# Patient Record
Sex: MALE | Race: WHITE | NOT HISPANIC OR LATINO | Employment: FULL TIME | URBAN - METROPOLITAN AREA
[De-identification: names, ages, dates, MRNs, and addresses within clinical notes are randomized per-mention and may not be internally consistent; named-entity substitution may affect disease eponyms.]

---

## 2019-04-16 ENCOUNTER — HOSPITAL ENCOUNTER (OUTPATIENT)
Dept: MEDSURG UNIT | Facility: HOSPITAL | Age: 49
End: 2019-04-18
Attending: SURGERY | Admitting: SURGERY

## 2019-04-16 LAB
ABS NEUT (OLG): 12.52 X10(3)/MCL (ref 2.1–9.2)
ALBUMIN SERPL-MCNC: 3.9 GM/DL (ref 3.4–5)
ALBUMIN/GLOB SERPL: 1.1 RATIO (ref 1.1–2)
ALP SERPL-CCNC: 102 UNIT/L (ref 50–136)
ALT SERPL-CCNC: 53 UNIT/L (ref 12–78)
APPEARANCE, UA: CLEAR
AST SERPL-CCNC: 25 UNIT/L (ref 15–37)
BACTERIA SPEC CULT: ABNORMAL /HPF
BASOPHILS # BLD AUTO: 0 X10(3)/MCL (ref 0–0.2)
BASOPHILS NFR BLD AUTO: 0 %
BILIRUB SERPL-MCNC: 0.4 MG/DL (ref 0.2–1)
BILIRUB UR QL STRIP: NEGATIVE
BILIRUBIN DIRECT+TOT PNL SERPL-MCNC: 0.1 MG/DL (ref 0–0.5)
BILIRUBIN DIRECT+TOT PNL SERPL-MCNC: 0.3 MG/DL (ref 0–0.8)
BUN SERPL-MCNC: 15 MG/DL (ref 7–18)
CALCIUM SERPL-MCNC: 8.9 MG/DL (ref 8.5–10.1)
CHLORIDE SERPL-SCNC: 102 MMOL/L (ref 98–107)
CO2 SERPL-SCNC: 24 MMOL/L (ref 21–32)
COLOR UR: YELLOW
CREAT SERPL-MCNC: 1.02 MG/DL (ref 0.7–1.3)
ERYTHROCYTE [DISTWIDTH] IN BLOOD BY AUTOMATED COUNT: 13.4 % (ref 11.5–17)
GLOBULIN SER-MCNC: 3.4 GM/DL (ref 2.4–3.5)
GLUCOSE (UA): NEGATIVE
GLUCOSE SERPL-MCNC: 137 MG/DL (ref 74–106)
HCT VFR BLD AUTO: 51.7 % (ref 42–52)
HGB BLD-MCNC: 16.6 GM/DL (ref 14–18)
HGB UR QL STRIP: NEGATIVE
KETONES UR QL STRIP: ABNORMAL
LEUKOCYTE ESTERASE UR QL STRIP: NEGATIVE
LIPASE SERPL-CCNC: 58 UNIT/L (ref 73–393)
LYMPHOCYTES # BLD AUTO: 1 X10(3)/MCL (ref 0.6–4.6)
LYMPHOCYTES NFR BLD AUTO: 7 %
MCH RBC QN AUTO: 28.8 PG (ref 27–31)
MCHC RBC AUTO-ENTMCNC: 32.1 GM/DL (ref 33–36)
MCV RBC AUTO: 89.6 FL (ref 80–94)
MONOCYTES # BLD AUTO: 0.6 X10(3)/MCL (ref 0.1–1.3)
MONOCYTES NFR BLD AUTO: 4 %
NEUTROPHILS # BLD AUTO: 12.52 X10(3)/MCL (ref 2.1–9.2)
NEUTROPHILS NFR BLD AUTO: 88 %
NITRITE UR QL STRIP: NEGATIVE
PH UR STRIP: 7 [PH] (ref 5–9)
PLATELET # BLD AUTO: 172 X10(3)/MCL (ref 130–400)
PMV BLD AUTO: 11.3 FL (ref 9.4–12.4)
POTASSIUM SERPL-SCNC: 4.1 MMOL/L (ref 3.5–5.1)
PROT SERPL-MCNC: 7.3 GM/DL (ref 6.4–8.2)
PROT UR QL STRIP: NEGATIVE
RBC # BLD AUTO: 5.77 X10(6)/MCL (ref 4.7–6.1)
RBC #/AREA URNS HPF: ABNORMAL /[HPF]
SODIUM SERPL-SCNC: 135 MMOL/L (ref 136–145)
SP GR UR STRIP: 1.02 (ref 1–1.03)
SQUAMOUS EPITHELIAL, UA: ABNORMAL
TROPONIN I SERPL-MCNC: <0.02 NG/ML (ref 0.02–0.49)
UROBILINOGEN UR STRIP-ACNC: 1
WBC # SPEC AUTO: 14.3 X10(3)/MCL (ref 4.5–11.5)
WBC #/AREA URNS HPF: ABNORMAL /HPF

## 2019-04-17 LAB
ABS NEUT (OLG): 9.04 X10(3)/MCL (ref 2.1–9.2)
BASOPHILS # BLD AUTO: 0 X10(3)/MCL (ref 0–0.2)
BASOPHILS NFR BLD AUTO: 0 %
BUN SERPL-MCNC: 13 MG/DL (ref 7–18)
CALCIUM SERPL-MCNC: 8 MG/DL (ref 8.5–10.1)
CHLORIDE SERPL-SCNC: 106 MMOL/L (ref 98–107)
CO2 SERPL-SCNC: 26 MMOL/L (ref 21–32)
CREAT SERPL-MCNC: 0.98 MG/DL (ref 0.7–1.3)
CREAT/UREA NIT SERPL: 13.3
EOSINOPHIL # BLD AUTO: 0 X10(3)/MCL (ref 0–0.9)
EOSINOPHIL NFR BLD AUTO: 0 %
ERYTHROCYTE [DISTWIDTH] IN BLOOD BY AUTOMATED COUNT: 13.6 % (ref 11.5–17)
GLUCOSE SERPL-MCNC: 98 MG/DL (ref 74–106)
HCT VFR BLD AUTO: 47.8 % (ref 42–52)
HGB BLD-MCNC: 15.6 GM/DL (ref 14–18)
LYMPHOCYTES # BLD AUTO: 1.8 X10(3)/MCL (ref 0.6–4.6)
LYMPHOCYTES NFR BLD AUTO: 15 %
MCH RBC QN AUTO: 29.3 PG (ref 27–31)
MCHC RBC AUTO-ENTMCNC: 32.6 GM/DL (ref 33–36)
MCV RBC AUTO: 89.8 FL (ref 80–94)
MONOCYTES # BLD AUTO: 1 X10(3)/MCL (ref 0.1–1.3)
MONOCYTES NFR BLD AUTO: 9 %
NEUTROPHILS # BLD AUTO: 9.04 X10(3)/MCL (ref 2.1–9.2)
NEUTROPHILS NFR BLD AUTO: 76 %
PLATELET # BLD AUTO: 166 X10(3)/MCL (ref 130–400)
PMV BLD AUTO: 11.2 FL (ref 9.4–12.4)
POTASSIUM SERPL-SCNC: 3.9 MMOL/L (ref 3.5–5.1)
RBC # BLD AUTO: 5.32 X10(6)/MCL (ref 4.7–6.1)
SODIUM SERPL-SCNC: 138 MMOL/L (ref 136–145)
WBC # SPEC AUTO: 12 X10(3)/MCL (ref 4.5–11.5)

## 2022-04-30 NOTE — ED PROVIDER NOTES
Patient:   Grant Gore            MRN: [id number]            FIN: [id number]               Age:   48 years     Sex:  Male     :  1970   Associated Diagnoses:   Acute appendicitis   Author:   Marcia SALINAS MD, Mike RONDON      Basic Information   Time seen: Date & time 2019 18:34:00.   History source: Patient.   Arrival mode: Private vehicle.   History limitation: None.      History of Present Illness   The patient presents with 47 y/o cm presents with right upper abdominal pain since this AM.  Admits nausea and dry heaving.  Normal BM.  States pain radiates into chest.  CANDIS Cagle, Dr. Kennedy, assumed care of this patient at .   48 year old CM with hx of a hernia repair presents to the ED with constant right abdominal pain, onset of this morning. Pt states his pain feels pressure under hsi ribs and like he has a stomach ache. He says his symptoms have gotten progressively worse through the day. He states he has never felt his symptoms before.  He ate this morning at 6:30, but is not hungry presently. At 1230 today, pt states he dry heaved. Pt denies diarrhea, dysuria, chest pain, constipation, feeling nauseated currently, hematuria, recent indigestion, head ache, neck pain, vision changes, swollen glands, weak extremities, or an ear ache. His level of pain is a 8/10 currently..  The onset was this morning (19).  The course/duration of symptoms is constant and worsening.  The character of symptoms is pressure.  The degree at onset was moderate.  The Location of pain at onset was right and abdominal.  The degree at present is moderate, 8 /10.  The Location of pain at present is right and abdominal.  Radiating pain: none. The exacerbating factor is none.  The relieving factor is none.  Therapy today: none.  Risk factors consist of none.  Associated symptoms: nausea, Denies constipation, hematuria, current nausea, recent indigestion, swollen glands, weak extremities, and ear  ache, denies chest pain, denies vomiting, denies diarrhea, denies headache, denies neck pain, denies altered vision and denies dysuria.  Additional history: none.        Review of Systems   Constitutional symptoms:  No fever, no chills, no sweats, no weakness.    Skin symptoms:  No rash,    Eye symptoms:  No recent vision problems,    ENMT symptoms:  No ear pain,    Respiratory symptoms:  No shortness of breath, no orthopnea.    Cardiovascular symptoms:  No chest pain, no palpitations.    Gastrointestinal symptoms:  Abdominal pain, right upper quadrant, right lower quadrant, Denies recent indigestion., no nausea, no vomiting, no diarrhea, no constipation.    Genitourinary symptoms:  No dysuria, no hematuria.    Musculoskeletal symptoms:  Denies neck pain., no back pain, no Muscle pain.    Neurologic symptoms:  Denies weak extremities, no headache, no vision changes.    Psychiatric symptoms:  No anxiety, no depression.    Hematologic/Lymphatic symptoms:  No swollen nodes,    Allergy/immunologic symptoms:  No seasonal allergies, no food allergies.              Additional review of systems information: All other systems reviewed and otherwise negative.      Health Status   Allergies: No known allergies.   Medications:  (Selected)   Inpatient Medications  Ordered  Sodium Chloride 0.9% 1000mL 1,000 mL: 1,000 mL, 1,000 mL, IV, 150 mL/hr, start date 04/16/19 20:10:00 CDT  Toradol: 30 mg, form: Injection, IV Push, Once, first dose 04/16/19 20:09:00 CDT, stop date 04/16/19 20:09:00 CDT, STAT.      Past Medical/ Family/ Social History   Medical history:    No active or resolved past medical history items have been selected or recorded..   Surgical history: Hernia repair.   Family history:    No family history items have been selected or recorded..   Social history: Tobacco use: Denies, Occupation: Employed.      Physical Examination               Vital Signs               Per nurse's notes.   Oxygen saturation.   General:   Alert, no acute distress.    Skin:  Warm, pink, intact, moist, no rash.    Head:  Normocephalic, atraumatic.    Cardiovascular:  Regular rate and rhythm, No murmur, Normal peripheral perfusion, No edema.    Respiratory:  Lungs are clear to auscultation, respirations are non-labored, breath sounds are equal, Symmetrical chest wall expansion.    Gastrointestinal:  Soft, Non distended, Normal bowel sounds, Tenderness: Mild, right lower quadrant, Guarding: Involuntary, Rebound: Positive.    Musculoskeletal:  Normal ROM, normal strength.    Neurological:  Alert and oriented to person, place, time, and situation, No focal neurological deficit observed, CN II-XII intact, normal sensory observed, normal motor observed, normal speech observed.    Lymphatics:  No lymphadenopathy.   Psychiatric:  Cooperative, appropriate mood & affect, normal judgment.       Medical Decision Making   Documents reviewed:  Emergency department nurses' notes, emergency department records.    Orders  Launch Orders   Laboratory:  Troponin-I (Order): Stat collect, 4/16/2019 18:36 CDT, Blood, Lab Collect, Print Label By Order Location, 4/16/2019 18:36 CDT  Urinalysis Complete a reflex to culture (Order): Stat collect, Urine, 4/16/2019 18:36 CDT, Nurse collect, Print Label By Order Location  Lipase Level (Order): Stat collect, 4/16/2019 18:36 CDT, Blood, Lab Collect, Print Label By Order Location, 4/16/2019 18:36 CDT  CMP (Order): Stat collect, 4/16/2019 18:35 CDT, Blood, Lab Collect, Print Label By Order Location, 4/16/2019 18:35 CDT  CBC w/ Auto Diff (Order): Now collect, 4/16/2019 18:35 CDT, Blood, Lab Collect, Print Label By Order Location, 4/16/2019 18:35 CDT  Patient Care:  Saline Lock Insert (Order): 4/16/2019 18:36 CDT  Pharmacy:  Sodium Chloride 0.9% intravenous solution (IVF Normal Saline NS Bolus 1000ml) (Order): 1,000 mL, 1,000 mL, IV, start date 4/16/2019 18:36 CDT, stop date 4/16/2019 18:36 CDT  morphine 4 mg/mL preservative-free  intravenous solution (Order): 4 mg, IV Push, Once-NOW, first dose 4/16/2019 18:36 CDT, stop date 4/16/2019 18:36 CDT, STAT, ( > 7 on pain scale)  Zofran 2 mg/mL injectable solution (Order): 4 mg, form: Injection, IV Push, Once, first dose 4/16/2019 18:36 CDT, stop date 4/16/2019 18:36 CDT, STAT  Radiology:  XR Chest 2 Views (Order): Stat, 4/16/2019 18:36 CDT, Chest Pain, None, Stretcher, Rad Type, Not Scheduled  Cardiology:  EKG (Order): 4/16/2019 18:36 CDT, NOW, -1, -1, 4/16/2019 18:36 CDT.   Results review:  Lab results : Lab View   4/16/2019 18:47 CDT      Sodium Lvl                135 mmol/L  LOW                             Potassium Lvl             4.1 mmol/L                             Chloride                  102 mmol/L                             CO2                       24.0 mmol/L                             Calcium Lvl               8.9 mg/dL                             Glucose Lvl               137 mg/dL  HI                             BUN                       15.0 mg/dL                             Creatinine                1.02 mg/dL                             eGFR-AA                   >60 mL/min/1.73 m2  NA                             eGFR-JEFF                  >60 mL/min/1.73 m2  NA                             Bili Total                0.4 mg/dL                             Bili Direct               0.10 mg/dL                             Bili Indirect             0.30 mg/dL                             AST                       25 unit/L                             ALT                       53 unit/L                             Alk Phos                  102 unit/L                             Total Protein             7.3 gm/dL                             Albumin Lvl               3.90 gm/dL                             Globulin                  3.40 gm/dL                             A/G Ratio                 1.1 ratio                             Lipase Lvl                58 unit/L  LOW                              Troponin-I                <0.02 ng/mL                             WBC                       14.3 x10(3)/mcL  HI                             RBC                       5.77 x10(6)/mcL                             Hgb                       16.6 gm/dL                             Hct                       51.7 %                             Platelet                  172 x10(3)/mcL                             MCV                       89.6 fL                             MCH                       28.8 pg                             MCHC                      32.1 gm/dL  LOW                             RDW                       13.4 %                             MPV                       11.3 fL                             Abs Neut                  12.52 x10(3)/mcL  HI                             Neutro Auto               88 %  NA                             Lymph Auto                7 %  NA                             Mono Auto                 4 %  NA                             Basophil Auto             0 %  NA                             Abs Neutro                12.52 x10(3)/mcL  HI                             Abs Lymph                 1.0 x10(3)/mcL                             Abs Mono                  0.6 x10(3)/mcL                             Abs Baso                  0.0 x10(3)/mcL    4/16/2019 18:43 CDT      UA Appear                 CLEAR                             UA Color                  YELLOW                             UA Spec Grav              1.024                             UA Bili                   Negative                             UA pH                     7.0                             UA Urobilinogen           1.0                             UA Blood                  Negative                             UA Glucose                Negative                             UA Ketones                2+                             UA Protein                Negative                             UA  Nitrite                Negative                             UA Leuk Est               Negative                             UA WBC                    NONE SEEN /HPF                             UA RBC                    NONE SEEN                             UA Bacteria               NONE SEEN /HPF                             UA Squam Epithelial       NONE SEEN  .   Radiology results:  Computed tomography, CT of the abdomen acute appendicitis with surrounding inflammation no abscess or rupture.       Reexamination/ Reevaluation   Time: 4/16/2019 21:08:00 .   Interventions: Pain well controlled patient CT positive acute appendicitis nontoxic Zosyn given IV discussed case with surgical hospitalist will admit.      Impression and Plan   Diagnosis   Acute appendicitis (HPA83-KB K35.80)   Plan   Condition: Improved, Stable.    Disposition: Admit time  4/16/2019 21:08:00, Admit to Inpatient Unit.    Counseled: Patient, Family, Regarding diagnosis, Regarding diagnostic results, Regarding treatment plan, Regarding prescription, Patient indicated understanding of instructions.    Notes: I, Cydney Villa,  worked solely as a scribe for ,       This scribes note accurately reflects the work done by me I have reviewed the note and personally performed a history and physical and agree with all the documentation and findings.

## 2022-04-30 NOTE — OP NOTE
DATE OF SURGERY:    04/17/2019    SURGEON:  Oseas Kaplan Jr., MD    RESIDENT SURGEON:  Dr. Leidy Gilmore, PGY 2.    PREOPERATIVE DIAGNOSIS:  Acute appendicitis.    POSTOPERATIVE DIAGNOSIS:  Acute appendicitis.    PROCEDURE PERFORMED:  Laparoscopic appendectomy.    ANESTHESIA:  General endotracheal.    COMPLICATIONS:  None.    CONDITION:  Stable.    ESTIMATED BLOOD LOSS:  10 cc.    FINDINGS:  Mr. Gore had a retrocecal appendix that was adhesed to the terminal ileum and being covered up.  This required extensive lysis of adhesions to remove the small bowel off the appendix and then freeing it up from the retrocecal space.    PROCEDURE IN DETAIL:  After proper written consents were obtained, the patient was brought back to the operative suite, placed in supine position, placed under general endotracheal anesthesia.  Next, abdomen was prepped and draped in usual sterile fashion.  At this time, a time-out was done to make sure we had the appropriate patient, appropriate operation, appropriate preoperative medications.  Next, a supraumbilical incision was made with an 11 blade, and a 5 mm optical trocar was used to enter the belly.  Once in the abdomen, pneumoperitoneum was created.  Next, a 5 mm suprapubic and 12 mm left lower quadrant trocar were placed under direct visualization with no complications.  Upon getting in, we visualized the terminal ileum adhesed to the abdominal sidewall and covering the cecum and the appendix.  It was densely adhesed, and this was taken down with a combination of electrocautery and sharp dissection with the laparoscopic Endoscissors.  Once we were able to mobilize and medialize the terminal ileum out of the way, we were able to visualize the retrocecal appendix.  After some time of blunt dissection down into the retrocecal position, we were able to visualize where the appendix came off the cecum.  Once we had the base of the appendix identified, we then made a window between  the mesoappendix and the cecum, and since the base of the appendix was very inflamed and not healthy-looking, we opted to take it lower and taking some of the cecum with it.  We stapled across this with a laparoscopic [place] [address] stapler 45 mm blue load, and then, we came across the mesoappendix with a laparoscopic [address] stapler white load 45 mm.  The large inflamed appendix was then placed in a laparoscopic Endo Catch bag and then removed through the left lower quadrant incision.  Next, our staple lines were investigated for any bleeding.  No bleeding was noted.  We then removed the appendix through the left lower quadrant incision.  Next, a Tor-Liliana suture passer was used to close the left lower quadrant fascia incision under laparoscopic visualization, and then, all skin incisions were closed with interrupted 4-0 Vicryl and dressed with Steri-Strips.  At case end, all counts were correct.  Patient tolerated procedure well, was ultimately transferred to the floor in stable condition.        ______________________________  MD NAVA Hayes Jr./NORM  DD:  04/17/2019  Time:  10:30AM  DT:  04/17/2019  Time:  10:42AM  Job #:  [id number]

## 2022-04-30 NOTE — H&P
"   Patient:   Grant Gore            MRN: [id number]            FIN: [id number]               Age:   48 years     Sex:  Male     :  1970   Associated Diagnoses:   None   Author:   Tyrell Bains      Basic Information   Admit information:  Right lower quadrant abdominal pain .    Source of history:  Self, Family member, Medical record.    Present at bedside:  Medical personnel.    Referral source:  Emergency department.    History limitation:  None.       History of Present Illness   48-year-old white male presents to the emergency department with a 1 day history of right lower quadrant abdominal pain.  He reports nausea at "dry heaving".  Bowel movements normal.  Denies fever.  No past medical history.  Past surgical history is a laparoscopic right inguinal hernia repair as well as a cosmetic jaw surgery in the past.  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      Review of Systems   Constitutional:  Decreased activity, No fever, No chills.    Eye:  No recent visual problem.    Ear/Nose/Mouth/Throat:  Negative.    Respiratory:  No shortness of breath, No cough, No sputum production.    Cardiovascular:  No chest pain, No bradycardia, No syncope.    Gastrointestinal:  Negative except as documented in history of present illness.    Genitourinary:  Negative.    Immunologic:  Negative.    Musculoskeletal:  No neck pain, No joint pain, No muscle pain.    Integumentary:  Negative.    Neurologic:  Alert and oriented X4, No headache.    Psychiatric:  No anxiety, No depression.       Health Status   Allergies:    Allergies (1) Active Reaction  No Known Allergies None Documented   Current medications:    Medications (5) Active  Scheduled: (1)  piperacillin-tazobactam  3.375 gm 1 EA, IV Piggyback, q8hr  Continuous: (1)  sodium chloride 0.9% 1,000 mL  1,000 mL, IV, 150 mL/hr  PRN: (3)  morphine 4 mg/mL preservative-free Soln  2 mg 0.5 mL, IV, q4hr  morphine 4 mg/mL preservative-free Soln "  4 mg 1 mL, IV, q4hr  ondansetron 2 mg/mL inj - 2mL  4 mg 2 mL, IV Push, q4hr   Problem list:    No qualifying data available      Histories   Past Medical History: Negative   Procedure history: As above   Social History        Social & Psychosocial Habits    Tobacco  04/16/2019  Use: Never (less than 100 in l    Patient Wants Consult For Cessation Counseling No.        Physical Examination   General:  Alert and oriented, No acute distress.    Eye:  Pupils are equal, round and reactive to light, Extraocular movements are intact.    Neck:  Supple, Non-tender.    Respiratory:  Lungs are clear to auscultation, Respirations are non-labored, Breath sounds are equal, Symmetrical chest wall expansion.    Cardiovascular:  Normal rate, Regular rhythm, Good pulses equal in all extremities, Normal peripheral perfusion.    Gastrointestinal:  Soft, Non-distended, Normal bowel sounds, Tender in the right lower quadrant.       Vital Signs (last 24 hrs)_____  Last Charted___________  Temp Oral     36.8 DegC  (APR 16 18:32)  Heart Rate Peripheral   94 bpm  (APR 16 21:00)  Resp Rate         15 br/min  (APR 16 21:00)  SBP      H 153mmHg  (APR 16 21:00)  DBP      90 mmHg  (APR 16 21:00)  SpO2      94 %  (APR 16 21:00)   Musculoskeletal:  Normal range of motion, Normal strength, No deformity.    Integumentary:  Warm, Dry.    Neurologic:  Alert, Oriented.    Psychiatric:  Cooperative, Appropriate mood & affect.       Review / Management   Labs Last 24 Hours   Chemistry  Hematology/Coagulation    Sodium Lvl: 135 mmol/L Low (04/16/19 18:47:00) WBC: 14.3 x10(3)/mcL High (04/16/19 18:47:00)   Potassium Lvl: 4.1 mmol/L (04/16/19 18:47:00) RBC: 5.77 x10(6)/mcL (04/16/19 18:47:00)   Chloride: 102 mmol/L (04/16/19 18:47:00) Hgb: 16.6 gm/dL (04/16/19 18:47:00)   CO2: 24 mmol/L (04/16/19 18:47:00) Hct: 51.7 % (04/16/19 18:47:00)   Calcium Lvl: 8.9 mg/dL (04/16/19 18:47:00) Platelet: 172 x10(3)/mcL (04/16/19 18:47:00)   Glucose Lvl: 137 mg/dL High  (04/16/19 18:47:00) MCV: 89.6 fL (04/16/19 18:47:00)   BUN: 15 mg/dL (04/16/19 18:47:00) MCH: 28.8 pg (04/16/19 18:47:00)   Creatinine: 1.02 mg/dL (04/16/19 18:47:00) MCHC: 32.1 gm/dL Low (04/16/19 18:47:00)   eGFR-AA: >60 (04/16/19 18:47:00) RDW: 13.4 % (04/16/19 18:47:00)   eGFR-JEFF: >60 (04/16/19 18:47:00) MPV: 11.3 fL (04/16/19 18:47:00)   Bili Total: 0.4 mg/dL (04/16/19 18:47:00) Abs Neut: 12.52 x10(3)/mcL High (04/16/19 18:47:00)   Bili Direct: 0.1 mg/dL (04/16/19 18:47:00) Neutro Auto: 88 % (04/16/19 18:47:00)   Bili Indirect: 0.3 mg/dL (04/16/19 18:47:00) Lymph Auto: 7 % (04/16/19 18:47:00)   AST: 25 unit/L (04/16/19 18:47:00) Mono Auto: 4 % (04/16/19 18:47:00)   ALT: 53 unit/L (04/16/19 18:47:00) Basophil Auto: 0 % (04/16/19 18:47:00)   Alk Phos: 102 unit/L (04/16/19 18:47:00) Abs Neutro: 12.52 x10(3)/mcL High (04/16/19 18:47:00)   Total Protein: 7.3 gm/dL (04/16/19 18:47:00) Abs Lymph: 1 x10(3)/mcL (04/16/19 18:47:00)   Albumin Lvl: 3.9 gm/dL (04/16/19 18:47:00) Abs Mono: 0.6 x10(3)/mcL (04/16/19 18:47:00)   Globulin: 3.4 gm/dL (04/16/19 18:47:00) Abs Baso: 0 x10(3)/mcL (04/16/19 18:47:00)   A/G Ratio: 1.1 ratio (04/16/19 18:47:00)    Lipase Lvl: 58 unit/L Low (04/16/19 18:47:00)    Troponin-I: <0.02 (04/16/19 18:47:00)      Accession: [id number]  Order: CT Abdomen and Pelvis W Contrast  Report Dt/Tm: 04/16/2019 20:53  Report:   CT Abdomen and Pelvis     Indication: Right-sided abdominal pain     Comparison: None     Technique:  Axial CT images were obtained through the abdomen and  pelvis after IV contrast administration.    . Coronal and sagittal  reconstructions submitted and interpreted.  Total DLP 1033. Automatic  exposure control utilized.     Findings:     Mild atelectatic changes are in the lower lungs.     Small cysts are on both kidneys. Adrenal glands, pancreas, spleen,  liver and gallbladder are normal.     Stomach is decompressed. Small bowel is normal. Appendix measures up  to 1.5 cm in  diameter with mucosal enhancement and surrounding  inflammation. Small appendicoliths are at its base. No free fluid or  free air. Colon is normal.     No enlarged lymph nodes. Abdominal aorta is normal in caliber.     Urinary bladder is normal. Prostate is normal size.     No acute osseous findings.     Impression:   1. Acute appendicitis without abscess formation or free air         Impression and Plan   Acute appendicitis    Admit to the floor  N.p.o.  Zosyn  Maintenance IV fluids  Patient is scheduled and consented for laparoscopic appendectomy in the morning  Attending surgeon aware patient

## 2022-08-28 ENCOUNTER — HOSPITAL ENCOUNTER (EMERGENCY)
Facility: HOSPITAL | Age: 52
Discharge: HOME | End: 2022-08-29
Attending: STUDENT IN AN ORGANIZED HEALTH CARE EDUCATION/TRAINING PROGRAM
Payer: COMMERCIAL

## 2022-08-28 VITALS
RESPIRATION RATE: 20 BRPM | SYSTOLIC BLOOD PRESSURE: 145 MMHG | DIASTOLIC BLOOD PRESSURE: 94 MMHG | OXYGEN SATURATION: 93 % | HEART RATE: 94 BPM | WEIGHT: 240 LBS | BODY MASS INDEX: 33.6 KG/M2 | TEMPERATURE: 99 F | HEIGHT: 71 IN

## 2022-08-28 DIAGNOSIS — R31.9 HEMATURIA, UNSPECIFIED TYPE: ICD-10-CM

## 2022-08-28 DIAGNOSIS — R10.9 LEFT FLANK PAIN: Primary | ICD-10-CM

## 2022-08-28 DIAGNOSIS — N20.1 URETEROLITHIASIS: ICD-10-CM

## 2022-08-28 DIAGNOSIS — R11.0 NAUSEA: ICD-10-CM

## 2022-08-28 LAB
ALBUMIN SERPL-MCNC: 4.4 GM/DL (ref 3.5–5)
ALBUMIN/GLOB SERPL: 1.3 RATIO (ref 1.1–2)
ALP SERPL-CCNC: 89 UNIT/L (ref 40–150)
ALT SERPL-CCNC: 25 UNIT/L (ref 0–55)
APPEARANCE UR: CLEAR
AST SERPL-CCNC: 27 UNIT/L (ref 5–34)
BASOPHILS # BLD AUTO: 0.02 X10(3)/MCL (ref 0–0.2)
BASOPHILS NFR BLD AUTO: 0.2 %
BILIRUB UR QL STRIP.AUTO: NEGATIVE MG/DL
BILIRUBIN DIRECT+TOT PNL SERPL-MCNC: 1 MG/DL
BUN SERPL-MCNC: 23 MG/DL (ref 8.4–25.7)
CALCIUM SERPL-MCNC: 9.8 MG/DL (ref 8.4–10.2)
CHLORIDE SERPL-SCNC: 105 MMOL/L (ref 98–107)
CO2 SERPL-SCNC: 21 MMOL/L (ref 22–29)
COLOR UR AUTO: YELLOW
CREAT SERPL-MCNC: 1.58 MG/DL (ref 0.73–1.18)
EOSINOPHIL # BLD AUTO: 0.03 X10(3)/MCL (ref 0–0.9)
EOSINOPHIL NFR BLD AUTO: 0.3 %
ERYTHROCYTE [DISTWIDTH] IN BLOOD BY AUTOMATED COUNT: 12.9 % (ref 11.5–17)
GFR SERPLBLD CREATININE-BSD FMLA CKD-EPI: 52 MLS/MIN/1.73/M2
GLOBULIN SER-MCNC: 3.3 GM/DL (ref 2.4–3.5)
GLUCOSE SERPL-MCNC: 127 MG/DL (ref 74–100)
GLUCOSE UR QL STRIP.AUTO: NEGATIVE MG/DL
HCT VFR BLD AUTO: 48.1 % (ref 42–52)
HGB BLD-MCNC: 16.6 GM/DL (ref 14–18)
IMM GRANULOCYTES # BLD AUTO: 0.03 X10(3)/MCL (ref 0–0.04)
IMM GRANULOCYTES NFR BLD AUTO: 0.3 %
KETONES UR QL STRIP.AUTO: ABNORMAL MG/DL
LEUKOCYTE ESTERASE UR QL STRIP.AUTO: NEGATIVE UNIT/L
LYMPHOCYTES # BLD AUTO: 1.06 X10(3)/MCL (ref 0.6–4.6)
LYMPHOCYTES NFR BLD AUTO: 10.5 %
MCH RBC QN AUTO: 29.8 PG (ref 27–31)
MCHC RBC AUTO-ENTMCNC: 34.5 MG/DL (ref 33–36)
MCV RBC AUTO: 86.4 FL (ref 80–94)
MONOCYTES # BLD AUTO: 0.59 X10(3)/MCL (ref 0.1–1.3)
MONOCYTES NFR BLD AUTO: 5.8 %
NEUTROPHILS # BLD AUTO: 8.4 X10(3)/MCL (ref 2.1–9.2)
NEUTROPHILS NFR BLD AUTO: 82.9 %
NITRITE UR QL STRIP.AUTO: NEGATIVE
NRBC BLD AUTO-RTO: 0 %
PH UR STRIP.AUTO: 6 [PH]
PLATELET # BLD AUTO: 201 X10(3)/MCL (ref 130–400)
PMV BLD AUTO: 11 FL (ref 7.4–10.4)
POTASSIUM SERPL-SCNC: 4.3 MMOL/L (ref 3.5–5.1)
PROT SERPL-MCNC: 7.7 GM/DL (ref 6.4–8.3)
PROT UR QL STRIP.AUTO: NEGATIVE MG/DL
RBC # BLD AUTO: 5.57 X10(6)/MCL (ref 4.7–6.1)
RBC UR QL AUTO: ABNORMAL UNIT/L
SODIUM SERPL-SCNC: 139 MMOL/L (ref 136–145)
SP GR UR STRIP.AUTO: >=1.03
UROBILINOGEN UR STRIP-ACNC: 0.2 MG/DL
WBC # SPEC AUTO: 10.1 X10(3)/MCL (ref 4.5–11.5)

## 2022-08-28 PROCEDURE — 85025 COMPLETE CBC W/AUTO DIFF WBC: CPT | Performed by: STUDENT IN AN ORGANIZED HEALTH CARE EDUCATION/TRAINING PROGRAM

## 2022-08-28 PROCEDURE — 99285 EMERGENCY DEPT VISIT HI MDM: CPT | Mod: 25

## 2022-08-28 PROCEDURE — 63600175 PHARM REV CODE 636 W HCPCS: Performed by: STUDENT IN AN ORGANIZED HEALTH CARE EDUCATION/TRAINING PROGRAM

## 2022-08-28 PROCEDURE — 96361 HYDRATE IV INFUSION ADD-ON: CPT

## 2022-08-28 PROCEDURE — 25000003 PHARM REV CODE 250: Performed by: STUDENT IN AN ORGANIZED HEALTH CARE EDUCATION/TRAINING PROGRAM

## 2022-08-28 PROCEDURE — 81001 URINALYSIS AUTO W/SCOPE: CPT | Performed by: STUDENT IN AN ORGANIZED HEALTH CARE EDUCATION/TRAINING PROGRAM

## 2022-08-28 PROCEDURE — 96375 TX/PRO/DX INJ NEW DRUG ADDON: CPT

## 2022-08-28 PROCEDURE — 96374 THER/PROPH/DIAG INJ IV PUSH: CPT

## 2022-08-28 PROCEDURE — 36415 COLL VENOUS BLD VENIPUNCTURE: CPT | Performed by: STUDENT IN AN ORGANIZED HEALTH CARE EDUCATION/TRAINING PROGRAM

## 2022-08-28 PROCEDURE — 80053 COMPREHEN METABOLIC PANEL: CPT | Performed by: STUDENT IN AN ORGANIZED HEALTH CARE EDUCATION/TRAINING PROGRAM

## 2022-08-28 RX ORDER — KETOROLAC TROMETHAMINE 30 MG/ML
15 INJECTION, SOLUTION INTRAMUSCULAR; INTRAVENOUS ONCE
Status: DISCONTINUED | OUTPATIENT
Start: 2022-08-28 | End: 2022-08-28

## 2022-08-28 RX ORDER — MORPHINE SULFATE 2 MG/ML
4 INJECTION, SOLUTION INTRAMUSCULAR; INTRAVENOUS
Status: COMPLETED | OUTPATIENT
Start: 2022-08-28 | End: 2022-08-28

## 2022-08-28 RX ORDER — KETOROLAC TROMETHAMINE 30 MG/ML
15 INJECTION, SOLUTION INTRAMUSCULAR; INTRAVENOUS ONCE
Status: COMPLETED | OUTPATIENT
Start: 2022-08-28 | End: 2022-08-28

## 2022-08-28 RX ADMIN — MORPHINE SULFATE 4 MG: 2 INJECTION, SOLUTION INTRAMUSCULAR; INTRAVENOUS at 09:08

## 2022-08-28 RX ADMIN — SODIUM CHLORIDE 1000 ML: 9 INJECTION, SOLUTION INTRAVENOUS at 09:08

## 2022-08-28 RX ADMIN — KETOROLAC TROMETHAMINE 15 MG: 30 INJECTION, SOLUTION INTRAMUSCULAR; INTRAVENOUS at 09:08

## 2022-08-29 LAB
BACTERIA #/AREA URNS AUTO: NORMAL /HPF
RBC #/AREA URNS AUTO: NORMAL /HPF
SQUAMOUS #/AREA URNS AUTO: NORMAL /HPF
WBC #/AREA URNS AUTO: NORMAL /HPF

## 2022-08-29 RX ORDER — ONDANSETRON 4 MG/1
4 TABLET, ORALLY DISINTEGRATING ORAL 2 TIMES DAILY
Qty: 8 TABLET | Refills: 0 | Status: SHIPPED | OUTPATIENT
Start: 2022-08-29 | End: 2022-09-02

## 2022-08-29 RX ORDER — HYDROCODONE BITARTRATE AND ACETAMINOPHEN 5; 325 MG/1; MG/1
1 TABLET ORAL EVERY 6 HOURS PRN
Qty: 5 TABLET | Refills: 0 | Status: SHIPPED | OUTPATIENT
Start: 2022-08-29

## 2022-08-29 NOTE — DISCHARGE INSTRUCTIONS
You will likely pass the stone very soon. Please return to the ED for any worsening or concerning symptoms. You may take the pain medicine as prescribed and as needed for pain. You may also take the nausea medicine as needed and as prescribed for nausea; if you take it, please allow roughly 20min for it to work. Please follow up with your PCP.

## 2022-08-29 NOTE — ED PROVIDER NOTES
Encounter Date: 8/28/2022       History     Chief Complaint   Patient presents with    Flank Pain     51 yo M no PMHx presents to the ED for 4 day history of waxing/waning, sharp, nonradiating flank pain. Patient states nothing makes pain worse or better, has not tried any medication. Has had some nausea associated with pain but no vomiting. Pt believes he has a kidney stone but he has never had one before. Denies hematuria. No testicle swelling/discoloration. No dysuria.     Review of patient's allergies indicates:  No Known Allergies  No past medical history on file.  No past surgical history on file.  No family history on file.     Review of Systems   Constitutional:  Negative for chills and fever.   HENT:  Negative for sore throat.    Respiratory:  Negative for shortness of breath.    Cardiovascular:  Negative for chest pain.   Gastrointestinal:  Positive for nausea. Negative for abdominal pain, diarrhea and vomiting.   Genitourinary:  Positive for flank pain. Negative for dysuria, hematuria, penile swelling, scrotal swelling, testicular pain and urgency.   Musculoskeletal:  Negative for back pain and myalgias.   Skin:  Negative for rash.   Neurological:  Negative for weakness and headaches.   Hematological:  Does not bruise/bleed easily.     Physical Exam     Initial Vitals   BP Pulse Resp Temp SpO2   08/28/22 2107 08/28/22 2107 08/28/22 2107 08/28/22 2131 08/28/22 2107   (!) 169/108 96 20 98.5 °F (36.9 °C) 95 %      MAP       --                Physical Exam    Nursing note and vitals reviewed.  Constitutional: He appears well-developed. He appears distressed (from pain).   HENT:   Head: Normocephalic and atraumatic.   Eyes: EOM are normal. Pupils are equal, round, and reactive to light.   Neck: Neck supple.   Normal range of motion.  Cardiovascular:  Normal rate, regular rhythm, normal heart sounds and intact distal pulses.           Pulmonary/Chest: Breath sounds normal. No respiratory distress. He has no  wheezes. He has no rhonchi. He has no rales.   Abdominal: Abdomen is soft. There is no abdominal tenderness.   No CVA tenderness There is no rebound.   Musculoskeletal:         General: No tenderness or edema. Normal range of motion.      Cervical back: Normal range of motion and neck supple.     Neurological: He is alert and oriented to person, place, and time. He has normal strength. No cranial nerve deficit or sensory deficit.   Skin: Skin is warm and dry. Capillary refill takes less than 2 seconds. No rash noted. No pallor.       ED Course   Procedures  Labs Reviewed   COMPREHENSIVE METABOLIC PANEL - Abnormal; Notable for the following components:       Result Value    Carbon Dioxide 21 (*)     Glucose Level 127 (*)     Creatinine 1.58 (*)     All other components within normal limits   URINALYSIS, REFLEX TO URINE CULTURE - Abnormal; Notable for the following components:    Ketones, UA 2+ (*)     Blood, UA 1+ (*)     All other components within normal limits   CBC WITH DIFFERENTIAL - Abnormal; Notable for the following components:    MPV 11.0 (*)     All other components within normal limits   URINALYSIS, MICROSCOPIC - Normal   CBC W/ AUTO DIFFERENTIAL    Narrative:     The following orders were created for panel order CBC auto differential.  Procedure                               Abnormality         Status                     ---------                               -----------         ------                     CBC with Differential[[id number]]        Abnormal            Final result                 Please view results for these tests on the individual orders.          Imaging Results              CT Abdomen Pelvis  Without Contrast (Preliminary result)  Result time 08/28/22 22:30:05      Preliminary result by Fuad Lofton MD (08/28/22 22:30:05)                   Narrative:    START OF REPORT:  Technique: CT of the abdomen and pelvis was performed with axial images as well as sagittal and coronal  reconstruction images without intravenous contrast.    Comparison: None available.    Clinical History: Flank Pain.    Dosage Information: Automated Exposure Control was utilized.    Findings:  Lines and Tubes: None.  Thorax:  Lungs: There is minimal nonspecific dependent change at the lung bases.  Pleura: No effusions or thickening. No pneumothorax is seen in the visualized lung bases.  Heart: The heart size is within normal limits.  Abdomen:  Abdominal Wall: There are some surgical clips in right lower anterior abdominal wall which could reflect prior surgical history.  Liver: The liver appears unremarkable.  Biliary System: No intrahepatic or extrahepatic biliary duct dilatation is seen.  Gallbladder: The gallbladder appears unremarkable.  Pancreas: The pancreas appears unremarkable.  Spleen: The spleen appears unremarkable. A probable splenule measuring 1.5 cmin diameter is seen on series 2 image 36 inferior to the spleen.  Adrenals: The adrenal glands appear unremarkable.  Kidneys: The right kidney appears unremarkable with no stones cysts masses or hydronephrosis. There is mild left-sided hydroureteronephrosis secondary to 3.7 mm stone at the left ureterovesicle junction on series 2 image 87. There is associated mild perinephric and periureteric fat stranding. The left kidney otherwise appears unremarkable with no cysts masses.  Aorta: The abdominal aorta appears unremarkable.  IVC: Unremarkable.  Bowel:  Esophagus: The visualized esophagus appears unremarkable.  Stomach: The stomach appears unremarkable.  Duodenum: Unremarkable appearing duodenum.  Small Bowel: The small bowel appears unremarkable.  Colon: Nondistended.  Appendix: No appendix is identified and surgical clips are seen at the base of the cecum consistent with history of appendectomy.  Peritoneum: No intraperitoneal free air or ascites is seen.    Pelvis:  Bladder: The bladder appears unremarkable.  Male:  Prostate gland: The prostate gland  appears unremarkable.    Bony structures:  Dorsal Spine: There is subtle spondylosis of the visualized dorsal spine.  Bony Pelvis: The visualized bony structures of the pelvis appear unremarkable.      Impression:  1. There is mild left-sided hydroureteronephrosis secondary to 3.7 mm stone at the left ureterovesicle junction on series 2 image 87. There is associated mild perinephric and periureteric fat stranding. Correlate clinically as regards additional evaluation and followup.  2. Details and other findings as discussed above.                          Preliminary result by Interface, Rad Results In (08/28/22 22:30:05)                   Narrative:    START OF REPORT:  Technique: CT of the abdomen and pelvis was performed with axial images as well as sagittal and coronal reconstruction images without intravenous contrast.    Comparison: None available.    Clinical History: Flank Pain.    Dosage Information: Automated Exposure Control was utilized.    Findings:  Lines and Tubes: None.  Thorax:  Lungs: There is minimal nonspecific dependent change at the lung bases.  Pleura: No effusions or thickening. No pneumothorax is seen in the visualized lung bases.  Heart: The heart size is within normal limits.  Abdomen:  Abdominal Wall: There are some surgical clips in right lower anterior abdominal wall which could reflect prior surgical history.  Liver: The liver appears unremarkable.  Biliary System: No intrahepatic or extrahepatic biliary duct dilatation is seen.  Gallbladder: The gallbladder appears unremarkable.  Pancreas: The pancreas appears unremarkable.  Spleen: The spleen appears unremarkable. A probable splenule measuring 1.5 cmin diameter is seen on series 2 image 36 inferior to the spleen.  Adrenals: The adrenal glands appear unremarkable.  Kidneys: The right kidney appears unremarkable with no stones cysts masses or hydronephrosis. There is mild left-sided hydroureteronephrosis secondary to 3.7 mm stone at  the left ureterovesicle junction on series 2 image 87. There is associated mild perinephric and periureteric fat stranding. The left kidney otherwise appears unremarkable with no cysts masses.  Aorta: The abdominal aorta appears unremarkable.  IVC: Unremarkable.  Bowel:  Esophagus: The visualized esophagus appears unremarkable.  Stomach: The stomach appears unremarkable.  Duodenum: Unremarkable appearing duodenum.  Small Bowel: The small bowel appears unremarkable.  Colon: Nondistended.  Appendix: No appendix is identified and surgical clips are seen at the base of the cecum consistent with history of appendectomy.  Peritoneum: No intraperitoneal free air or ascites is seen.    Pelvis:  Bladder: The bladder appears unremarkable.  Male:  Prostate gland: The prostate gland appears unremarkable.    Bony structures:  Dorsal Spine: There is subtle spondylosis of the visualized dorsal spine.  Bony Pelvis: The visualized bony structures of the pelvis appear unremarkable.      Impression:  1. There is mild left-sided hydroureteronephrosis secondary to 3.7 mm stone at the left ureterovesicle junction on series 2 image 87. There is associated mild perinephric and periureteric fat stranding. Correlate clinically as regards additional evaluation and followup.  2. Details and other findings as discussed above.                                         Medications   sodium chloride 0.9% bolus 1,000 mL (0 mLs Intravenous Stopped 8/28/22 2226)   morphine injection 4 mg (4 mg Intravenous Given 8/28/22 2125)   ketorolac injection 15 mg (15 mg Intravenous Given 8/28/22 2124)     Medical Decision Making:   Initial Assessment:   51 yo M with no PMHx presents for a 4 day history of L flank pain with nausea but no vomiting. On exam he is uncomfortable from pain, slightly hypertensive but otherwise vitally stable, lungs clear, abdomen soft w/o CVA tenderness,  exam benign, 2+ pulses and no neuro deficit.   Differential Diagnosis:    Ureterolithiasis, Diverticulitis, Aortic dissection, MSK pain  Clinical Tests:   Lab Tests: Ordered and Reviewed  The following lab test(s) were unremarkable: CBC, CMP and Urinalysis       <> Summary of Lab: CBC nml, CMP with mild Cr elevation. UA with blood, no signs of infection.  Radiological Study: Ordered and Reviewed  ED Management:  51 yo M presents for L flank pain. Labs generally WNL. CT A/P w/o contrast revealed small L UVJ stone. Patient given fluids and IV pain control for significant pain with relief. Patient was counseled on diagnosis and follow up with PCP. Given strict return precautions to the ED and provided prescriptions for symptomatic control outpatient. He expressed understanding and agreed to the plan.                     Clinical Impression:   Final diagnoses:  [R10.9] Left flank pain (Primary)  [R11.0] Nausea  [N20.1] Ureterolithiasis  [R31.9] Hematuria, unspecified type      ED Disposition Condition    Discharge Stable          ED Prescriptions       Medication Sig Dispense Start Date End Date Auth. Provider    ondansetron (ZOFRAN-ODT) 4 MG TbDL Take 1 tablet (4 mg total) by mouth 2 (two) times daily. for 8 doses 8 tablet 8/29/2022 9/2/2022 Navin Taylor MD    HYDROcodone-acetaminophen (NORCO) 5-325 mg per tablet Take 1 tablet by mouth every 6 (six) hours as needed for Pain. 5 tablet 8/29/2022 -- Navin Taylor MD          Follow-up Information       Follow up With Specialties Details Why Contact Info    [facility] - Emergency Dept Emergency Medicine Go to  If symptoms worsen [address] [facility] [address] [phone]             Navin Taylor MD  08/29/22 0115